# Patient Record
Sex: FEMALE | Race: OTHER | NOT HISPANIC OR LATINO | URBAN - METROPOLITAN AREA
[De-identification: names, ages, dates, MRNs, and addresses within clinical notes are randomized per-mention and may not be internally consistent; named-entity substitution may affect disease eponyms.]

---

## 2020-04-02 ENCOUNTER — OUTPATIENT (OUTPATIENT)
Dept: OUTPATIENT SERVICES | Facility: HOSPITAL | Age: 45
LOS: 1 days | Discharge: HOME | End: 2020-04-02
Payer: COMMERCIAL

## 2020-04-02 DIAGNOSIS — J18.2 HYPOSTATIC PNEUMONIA, UNSPECIFIED ORGANISM: ICD-10-CM

## 2020-04-02 PROCEDURE — 71046 X-RAY EXAM CHEST 2 VIEWS: CPT | Mod: 26

## 2020-05-22 ENCOUNTER — OUTPATIENT (OUTPATIENT)
Dept: OUTPATIENT SERVICES | Facility: HOSPITAL | Age: 45
LOS: 1 days | Discharge: HOME | End: 2020-05-22
Payer: COMMERCIAL

## 2020-05-22 DIAGNOSIS — N63.20 UNSPECIFIED LUMP IN THE LEFT BREAST, UNSPECIFIED QUADRANT: ICD-10-CM

## 2020-05-22 PROCEDURE — 77066 DX MAMMO INCL CAD BI: CPT | Mod: 26

## 2020-05-22 PROCEDURE — G0279: CPT | Mod: 26

## 2020-05-22 PROCEDURE — 76642 ULTRASOUND BREAST LIMITED: CPT | Mod: 26,50

## 2020-06-03 ENCOUNTER — RESULT REVIEW (OUTPATIENT)
Age: 45
End: 2020-06-03

## 2020-06-03 ENCOUNTER — OUTPATIENT (OUTPATIENT)
Dept: OUTPATIENT SERVICES | Facility: HOSPITAL | Age: 45
LOS: 1 days | Discharge: HOME | End: 2020-06-03
Payer: COMMERCIAL

## 2020-06-03 PROCEDURE — 19083 BX BREAST 1ST LESION US IMAG: CPT | Mod: 50

## 2020-06-03 PROCEDURE — 88342 IMHCHEM/IMCYTCHM 1ST ANTB: CPT | Mod: 26

## 2020-06-03 PROCEDURE — 77066 DX MAMMO INCL CAD BI: CPT | Mod: 26

## 2020-06-03 PROCEDURE — 88341 IMHCHEM/IMCYTCHM EA ADD ANTB: CPT | Mod: 26

## 2020-06-03 PROCEDURE — 88305 TISSUE EXAM BY PATHOLOGIST: CPT | Mod: 26

## 2020-06-04 ENCOUNTER — TRANSCRIPTION ENCOUNTER (OUTPATIENT)
Age: 45
End: 2020-06-04

## 2020-06-04 PROBLEM — Z00.00 ENCOUNTER FOR PREVENTIVE HEALTH EXAMINATION: Status: ACTIVE | Noted: 2020-06-04

## 2020-06-04 LAB — SURGICAL PATHOLOGY STUDY: SIGNIFICANT CHANGE UP

## 2020-06-05 DIAGNOSIS — D05.12 INTRADUCTAL CARCINOMA IN SITU OF LEFT BREAST: ICD-10-CM

## 2020-06-05 DIAGNOSIS — D24.1 BENIGN NEOPLASM OF RIGHT BREAST: ICD-10-CM

## 2020-06-05 DIAGNOSIS — R92.0 MAMMOGRAPHIC MICROCALCIFICATION FOUND ON DIAGNOSTIC IMAGING OF BREAST: ICD-10-CM

## 2020-06-05 DIAGNOSIS — N63.10 UNSPECIFIED LUMP IN THE RIGHT BREAST, UNSPECIFIED QUADRANT: ICD-10-CM

## 2020-06-05 DIAGNOSIS — C50.912 MALIGNANT NEOPLASM OF UNSPECIFIED SITE OF LEFT FEMALE BREAST: ICD-10-CM

## 2020-06-09 ENCOUNTER — APPOINTMENT (OUTPATIENT)
Dept: BREAST CENTER | Facility: CLINIC | Age: 45
End: 2020-06-09
Payer: COMMERCIAL

## 2020-06-09 VITALS
SYSTOLIC BLOOD PRESSURE: 160 MMHG | HEIGHT: 60 IN | TEMPERATURE: 98.7 F | BODY MASS INDEX: 25.52 KG/M2 | DIASTOLIC BLOOD PRESSURE: 92 MMHG | WEIGHT: 130 LBS

## 2020-06-09 DIAGNOSIS — Z86.2 PERSONAL HISTORY OF DISEASES OF THE BLOOD AND BLOOD-FORMING ORGANS AND CERTAIN DISORDERS INVOLVING THE IMMUNE MECHANISM: ICD-10-CM

## 2020-06-09 DIAGNOSIS — Z80.0 FAMILY HISTORY OF MALIGNANT NEOPLASM OF DIGESTIVE ORGANS: ICD-10-CM

## 2020-06-09 DIAGNOSIS — Z86.79 PERSONAL HISTORY OF OTHER DISEASES OF THE CIRCULATORY SYSTEM: ICD-10-CM

## 2020-06-09 PROCEDURE — 99204 OFFICE O/P NEW MOD 45 MIN: CPT

## 2020-06-11 PROBLEM — Z86.2 HISTORY OF ANEMIA: Status: RESOLVED | Noted: 2020-06-11 | Resolved: 2020-06-11

## 2020-06-11 PROBLEM — Z80.0 FAMILY HISTORY OF COLON CANCER: Status: ACTIVE | Noted: 2020-06-11

## 2020-06-11 PROBLEM — Z86.79 HISTORY OF HYPERTENSION: Status: RESOLVED | Noted: 2020-06-11 | Resolved: 2020-06-11

## 2020-06-11 NOTE — REVIEW OF SYSTEMS
[Abn Vaginal Bleeding] : unexplained vaginal bleeding [Breast Lump] : breast lump [Hot Flashes] : hot flashes [As Noted in HPI] : as noted in HPI [Negative] : Heme/Lymph [Chills] : no chills [Fever] : no fever [Skin Lesions] : no skin lesions [Skin Wound] : no skin wound [Breast Pain] : no breast pain

## 2020-06-11 NOTE — HISTORY OF PRESENT ILLNESS
[FreeTextEntry1] : Shannan is a 45 premenopausal F with left breast cancer, [id number], ER/SC pos Her 2 neg, anatomic stage IIB, AJCC 8th edition. \par \par Starting in 2019, she started to experience spontaneous nonbloody LEFT nipple discharge.  She did not palpate any abnormal masses in either breast, and denies any breast pain at that time.  Then her discharge recurred several weeks prior, but at this time, she reports feeling a left breast mass that was not painful.  She denies any overlying skin changes, denies palpating any abnormal right breast masses and is currently not experiencing any nipple discharge. \par \par Her work up was as follows:\par 2020 -- b/l dx tomosynthesis\par -heterogenously dense breasts\par -hyperdense mass with calcifications measuring 3.5 cm in superior L breast \par -no suspicious microcalcifications/architectural distortion in R \par b/l US \par RIGHT: \par -@6N4, 1.3 x 1.4 x 0.5 cm oval circumscribed mass --> BIOPSY \par -no axillary lymphadenopathy \par LEFT: \par -@12N2, hypoechoic mass, 6.1 x 2.2 x 5.2 cm with associated calcifications --> BIOPSY \par -no axillary lymphadenopathy\par BIRADS 4\par \par 6/3/2020 -- b/l US CNBx \par -RIGHT @6N4 --> hyalinizing fibroadenoma \par -LEFT @12N2 --> IDC, moderately differentiated, with focal micropapillary and lobular features; DCIS, high nuclear grade, 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

## 2020-06-11 NOTE — DATA REVIEWED
[FreeTextEntry1] : EXAM: US BREAST LIMITED BI \par EXAM: MG MAMMO DIAG W ROMEL BI# \par \par \par PROCEDURE DATE: 05/22/2020 \par \par \par \par INTERPRETATION: Clinical History / Reason for exam: Palpable concern \par superior left breast. Screening right breast. \par \par The patient reports her last clinical breast examination was performed one \par month ago. \par \par Family history: Unknown. \par \par Comparisons: No priors. \par \par Views obtained: bilateral full field digital 2D and digital tomosynthesis \par images as well as spot views. \par \par Computer-aided detection was utilized in the interpretation of this \par examination. \par \par Breast composition: The breasts are heterogeneously dense, which may obscure \par small masses. \par \par Findings: \par \par Mammogram: \par At the area of palpable concern in the superior left breast middle depth \par there is a hyperdense mass with calcifications measuring 3.5 cm. \par There is an oval circumscribed mass in the inferior aspect of the right \par breast measuring 1.5 cm. No suspicious microcalcifications or architectural \par distortion in the right breast. \par \par Ultrasound: \par \par Targeted bilateral breast ultrasound was performed. \par \par Left breast: \par At 12:00 N2 area of palpable concern there is a hypoechoic mass measuring \par 6.1 x 2.2 x 5.2 cm with calcifications. Ultrasound-guided biopsy \par recommended. No left axillary lymphadenopathy. \par \par Right breast: \par At 6:00 N4 there is an oval circumscribed mass measuring 1.3 x 1.4 x 0.5 cm. \par Ultrasound-guided biopsy recommended. No right axillary lymphadenopathy. \par \par Impression: \par Left breast hypoechoic mass with calcifications at 12:00 N2. \par Ultrasound-guided biopsy recommended. \par \par Right breast hypoechoic mass 6 o'clock N4. Ultrasound-guided biopsy \par recommended. \par \par Recommendation: Ultrasound guided biopsy. \par \par BI-RADS Category 4: Suspicious \par \par The above findings and recommendations were discussed with the patient at \par the time of the examination. \par \par The above findings and recommendations were discussed with Dr. Campos on \par 5/22/2020 at 1:23 PM by Dr. Byrd via telephone. \par \par \par \par \par \par \par MICK BYRD M.D., ATTENDING RADIOLOGIST \par This document has been electronically signed. May 22 2020 3:07PM \par \par EXAM: US BX BRST 1ST BI SIS \par \par *** ADDENDUM 06/04/2020 *** \par \par Postprocedure patient follow-up and Pathology result: \par \par -Diagnosis: \par 1. BREAST, RIGHT 6 N4 MASS, ULTRASOUND GUIDED NEEDLE CORE \par BIOPSIES: \par - HYALINIZING FIBROADENOMA. \par \par 2. BREAST, LEFT 12 N2 MASS, ULTRASOUND GUIDED NEEDLE CORE \par BIOPSIES: \par - INVASIVE MODERATELY DIFFERENTIATED DUCTAL CARCINOMA WITH BOTH FOCAL \par MICROPAPILLARY AND LOBULAR FEATURES. 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 \par -The pathology results are concordant with the imaging findings. \par \par -Recommendation: Surgical/oncological consultation recommended. \par \par -No significant delayed complications. \par \par -Results were discussed with patient and her  on 6/4/2020 at 11:50 AM \par by Dr. Byrd via telephone. \par \par The results were also discussed with Dr. Oneil Painter on 6/4/2020 at 12:00 \par AM by Dr. Byrd via telephone. \par \par \par \par \par *** END OF ADDENDUM 06/04/2020 *** \par \par \par \par PROCEDURE DATE: 06/03/2020 \par \par \par \par INTERPRETATION: Clinical History / Reason for exam: Bilateral breast masses. \par \par PROCEDURES: both breasts ultrasound guided spring loaded core biopsy and \par post clip placement two view both breasts mammogram. \par \par Previous films and reports were reviewed. The procedure, its risks, \par benefits, and alternatives were explained to the patient, who expressed \par understanding and gave informed written and verbal consent. A time-out, \par which included the patient's full name, date of birth, description of the \par expected procedure and procedure site, was performed immediately before the \par procedure. \par \par Right breast mass: \par Using the usual sterile technique and ultrasound guidance, the previously \par described mass in the right breast 6:00 N4 was biopsied utilizing a 14-gauge \par automated Bard core biopsy device with a 13-gauge introducer sheath. Six \par samples were collected. A marking clip (Stylesight stop-light) was deployed \par under ultrasound guidance. Hemostasis was obtained and a sterile dressing \par was applied. \par \par Left breast: \par Using the usual sterile technique and ultrasound guidance, the previously \par described mass in the left breast 12:00 N2 was biopsied utilizing a 14-gauge \par automated Bard core biopsy device with a 13-gauge introducer sheath. 5 \par samples were collected. A marking clip (Stylesight top-hat) was deployed under \par ultrasound guidance. Hemostasis was obtained and a sterile dressing was \par applied. \par \par Bilateral mammogram where performed. This demonstrates the biopsy markers in \par appropriate position. \par \par The patient tolerated the procedure well and left the department in good \par condition with written discharge instructions. \par \par \par IMPRESSION: \par \par Successful ultrasound guided core biopsies of the both breast. \par \par Pathology: Pending, to be reported as an addendum. \par \par ***Please see the addendum at the top of this report. It may contain \par additional important information or changes.**** \par \par \par \par \par MICK BYRD M.D., ATTENDING RADIOLOGIST \par This document has been electronically signed. Mike 3 2020 1:51PM \par Addend: MICK BYRD M.D., ATTENDING RADIOLOGIST \par This addendum was electronically signed on: Jun 4 2020 12:08PM. \par

## 2020-06-11 NOTE — ASSESSMENT
[FreeTextEntry1] : Shannan is a 45 premenopausal F with a LEFT breast cancer, IDC, [id number], ER/MN pos, Her 2 neg, anatomic stage IIB, AJCC 8th edition. \par \par ON exam, I was able to palpate a 3 x 5 cm LEFT breast mass in the retroareolar area, without any overlying nipple or skin changes, and no lymphadenopathy b/l.  Her most recent imaging was a b/l dx mammogram and b/l US on 5/22/2020 which revealed a right breast mass @6N4, measuring 1.3 x 1.4 x 0.5 cm, which was a biopsy proven fibroadenoma, and a left breast mass @12N2, measuring 6.1 x 2.2 x 5.2 cm with associated calcifications, which was her biopsy proven breast cancer. \par \par We have discussed her new diagnosis of breast cancer.  She is currently a stage IIB breast cancer, based off AJCC 8th edition.  We discussed her surgical and other treatment options at this time. \par \par In regards to her left sided breast cancer, she is not a great candidate for breast conservation surgery as her mass in located centrally, measuring 6 cm.  We did discuss that at this point, she is also not a candidate for a nipple sparing mastectomy given that the mass is centrally located with nipple discharge. However, there is no difference in survival between a lumpectomy + radiation therapy and a mastectomy.  However the rate of local recurrence is slightly higher with the lumpectomy. The rate of recurrence with a mastectomy is not zero, however, and is likely closer to 4-9%.  \par \par She would also require an evaluation of her axillary lymph nodes via a sentinel lymph node biopsy.  This is done by injecting the perioareolar breast tissue with dye prior to the surgery and removing 1-5 lymph nodes that are the first to drain the breast.  If any of her lymph nodes were positive, she may need an axillary lymph node dissection. \par \par To further delineate the extent of her disease, I would like to obtain a bilateral breast MRI.  \par \par Because of the size of her tumor, she will be considered for chemotherapy.  However, her tumor is ER/MN pos, Her 2 neg.  We discussed the option of giving chemotherapy before or after surgery.  If given before surgery, this is considered neoadjuvant chemotherapy.  The benefits of undergoing neoadjuvant chemotherapy is that it will provide us with better local control of her breast cancer especially if she has a good pathologic response.  If she has a complete clinical response in her breast and lymph nodes, we may be able to avoid a mastectomy which would reduce the morbidity of her surgery.  In addition, it gives us prognostic information regarding her tumor response to chemotherapy.  Patients who have a complete pathologic response (pCR) were found to have an improved prognosis compared to women who do not have a pCR.  \par \par In regards to her treatment options: \par -If she were to pursue surgery upfront, she would need a left breast mastectomy and sentinel LN biopsy, possible ALND.  She would have the option of undergoing immediate vs. delayed reconstruction with a mastectomy.  \par \par To assist with decision making regarding neoadjuvant chemotherapy, I will send an oncotype on her biopsy specimen to help determine if her tumor would even respond well to chemotherapy.  I will have her follow up after her breast MRI, genetic testing, and ONcotype result to further discuss her treatment options. \par \par We did briefly discuss the different radiation options.  If she got a mastectomy, she would likely only need radiation therapy if she had a lot of positive margins or if she had positive axillary lymph nodes.  \par \par At the completion of all these treatments, she should get endocrine therapy, at current time she would need tamoxifen for a total of at least 5 years.  \par \par In regards to reconstruction, if she decides to proceed with a mastectomy,  she has the option for undergoing immediate breast reconstruction.  For this reason, I will have her see plastic surgery for further discussion of her reconstruction options.  \par \par In addition, because of her young age at diagnosis, she meets criteria for panel genetic testing.  About 10% of patients who meet the criteria were found to have a genetic mutation.  [facility] Panel genetic testing was performed today.  \par \par All of her questions were answered.  She knows to call with any further questions or concerns. I will see her after she gets her MRI breast. \par \par PLAN\par -breast MRI \par -?plastic surgery if she wants to consider a mastectomy \par -Oncotype/med onc referral if considering neoadjuvant chemotherapy\par -[facility] panel genetic testing -- blood drawn today \par -f/up after

## 2020-06-11 NOTE — PHYSICAL EXAM
[Normocephalic] : normocephalic [Atraumatic] : atraumatic [No dominant masses] : no dominant masses in right breast  [EOMI] : extra ocular movement intact [No Cervical Adenopathy] : no cervical adenopathy [No Supraclavicular Adenopathy] : no supraclavicular adenopathy [No Nipple Retraction] : no left nipple retraction [No Nipple Discharge] : no right nipple discharge [No Axillary Lymphadenopathy] : no left axillary lymphadenopathy [de-identified] : no suspicious mass palpated  [de-identified] : in retroareolar area, she has a 3 x 5 cm mass without any overlying skin changes, no nipple retraction, no lymphadenopathy, no other suspicious masses palpated

## 2020-06-11 NOTE — PAST MEDICAL HISTORY
[Menstruating] : The patient is menstruating [Definite ___ (Date)] : the last menstrual period was [unfilled] [Menarche Age ____] : age at menarche was [unfilled] [Irregular Cycle Intervals] : are  irregular [Total Preg ___] : G[unfilled] [Live Births ___] : P[unfilled]  [History of Hormone Replacement Treatment] : has no history of hormone replacement treatment [FreeTextEntry5] : s/p L salpingectomy in 2015 [FreeTextEntry6] : hx of IVF [FreeTextEntry7] : denies [FreeTextEntry8] : n/a

## 2020-06-12 ENCOUNTER — RESULT REVIEW (OUTPATIENT)
Age: 45
End: 2020-06-12

## 2020-06-12 ENCOUNTER — OUTPATIENT (OUTPATIENT)
Dept: OUTPATIENT SERVICES | Facility: HOSPITAL | Age: 45
LOS: 1 days | Discharge: HOME | End: 2020-06-12
Payer: COMMERCIAL

## 2020-06-12 DIAGNOSIS — C50.412 MALIGNANT NEOPLASM OF UPPER-OUTER QUADRANT OF LEFT FEMALE BREAST: ICD-10-CM

## 2020-06-12 PROCEDURE — 77049 MRI BREAST C-+ W/CAD BI: CPT | Mod: 26

## 2020-06-23 ENCOUNTER — APPOINTMENT (OUTPATIENT)
Dept: BREAST CENTER | Facility: CLINIC | Age: 45
End: 2020-06-23
Payer: COMMERCIAL

## 2020-06-23 PROCEDURE — 99213 OFFICE O/P EST LOW 20 MIN: CPT

## 2020-06-23 NOTE — ASSESSMENT
[FreeTextEntry1] : Shannan is a 45 premenopausal F with a LEFT breast cancer, IDC, [id number], ER/NM pos, Her 2 neg, anatomic stage IIB, AJCC 8th edition. \par \par ON exam, I was able to palpate a 3 x 5 cm LEFT breast mass in the retroareolar area, without any overlying nipple or skin changes, and no lymphadenopathy b/l.  Her most recent imaging was a b/l dx mammogram and b/l US on 5/22/2020 which revealed a right breast mass @6N4, measuring 1.3 x 1.4 x 0.5 cm, which was a biopsy proven fibroadenoma, and a left breast mass @12N2, measuring 6.1 x 2.2 x 5.2 cm with associated calcifications, which was her biopsy proven breast cancer. \par \par She did have a breast MRI which revealed her area of left central breast cancer was 3.7 x 2.5 x 2.8 cm with an associated area of enhancement measuring <4mm, no lymphadenopathy, and no right sided lesions.  \par \par Her Oncotype RS was 24.  She did not have any genetic mutations on [facility] panel genetic testing. \par \par We re-discussed her options.  Given her MRI findings, I have recommended that she proceed with a skin sparing mastectomy, SLN Bx and possible axillary lymph node dissection with immediate reconstruction with plastic surgery.  I will refer her to Dr. Mansfield  from plastic surgery for this reason.  \par \par In regards to neoadjuvant chemotherapy, I have discussed that based off her recurrence score, this was found to be an intermediate score.  Based off the TAILORx study, women under the age of 50 were found to have ~6.5% chemotherapy benefit so she may be considered for chemotherapy, however her final pathology may influence this decision.  In addition, although we may be able to pursue breast conservation surgery with a central lumpectomy, I do not advise sparing her nipple areolar complex even if she has a good response to the neoadjuvant chemotherapy given the involvement of her nipple on her breast MRI.  FOr this reason, I have recommended that we proceed with surgery first. \par \par AS REVIEW: \par We have discussed her new diagnosis of breast cancer.  She is currently a stage IIB breast cancer, based off AJCC 8th edition.  We discussed her surgical and other treatment options at this time. \par \par In regards to her left sided breast cancer, she is not a great candidate for breast conservation surgery as her mass in located centrally, measuring 6 cm.  We did discuss that at this point, she is also not a candidate for a nipple sparing mastectomy given that the mass is centrally located with nipple discharge. However, there is no difference in survival between a lumpectomy + radiation therapy and a mastectomy.  However the rate of local recurrence is slightly higher with the lumpectomy. The rate of recurrence with a mastectomy is not zero, however, and is likely closer to 4-9%.  \par \par She would also require an evaluation of her axillary lymph nodes via a sentinel lymph node biopsy.  This is done by injecting the perioareolar breast tissue with dye prior to the surgery and removing 1-5 lymph nodes that are the first to drain the breast.  If any of her lymph nodes were positive, she may need an axillary lymph node dissection. \par \par Because of the size of her tumor, she will be considered for chemotherapy.  However, her tumor is ER/NM pos, Her 2 neg.  We discussed the option of giving chemotherapy before or after surgery.  If given before surgery, this is considered neoadjuvant chemotherapy.  The benefits of undergoing neoadjuvant chemotherapy is that it will provide us with better local control of her breast cancer especially if she has a good pathologic response.  If she has a complete clinical response in her breast and lymph nodes, we may be able to avoid a mastectomy which would reduce the morbidity of her surgery.  In addition, it gives us prognostic information regarding her tumor response to chemotherapy.  Patients who have a complete pathologic response (pCR) were found to have an improved prognosis compared to women who do not have a pCR.  \par \par In regards to her treatment options: \par -If she were to pursue surgery upfront, she would need a left breastn skin sparing mastectomy and sentinel LN biopsy, possible ALND.  She would have the option of undergoing immediate vs. delayed reconstruction with a mastectomy.  \par \par We did briefly discuss the different radiation options.  If she got a mastectomy, she would likely only need radiation therapy if she had a lot of positive margins or if she had positive axillary lymph nodes.  \par \par At the completion of all these treatments, she should get endocrine therapy, at current time she would need tamoxifen for a total of at least 5 years.  \par \par In regards to reconstruction, if she decides to proceed with a mastectomy,  she has the option for undergoing immediate breast reconstruction.  For this reason, I will have her see plastic surgery for further discussion of her reconstruction options.  \par \par In addition, because of her young age at diagnosis, she meets criteria for panel genetic testing.  About 10% of patients who meet the criteria were found to have a genetic mutation.  [facility] Panel genetic testing was negative for any mutations.\par \par All of her questions were answered.  She knows to call with any further questions or concerns. Of note, she presented with her  and her friend today. \par \par PLAN\par -plastic surgery referral for evaluation of immediate reconstruction \par -OR: LEFT BREAST SKIN SPARING MASTECTOMY, SLN BX, POSSIBLE AXILLARY LYMPH NODE DISSECTION with immediate reconstruction and pec block.\par -DIAGNOSIS: LEFT BREAST CANCER \par -Shannan would like to think about her options at this point but is agreeable with meeting with the plastic surgeon, Dr. Mansfield.  She will call back when she has made a decision.\par

## 2020-06-23 NOTE — PHYSICAL EXAM
[Atraumatic] : atraumatic [Normocephalic] : normocephalic [No Cervical Adenopathy] : no cervical adenopathy [No Supraclavicular Adenopathy] : no supraclavicular adenopathy [EOMI] : extra ocular movement intact [No dominant masses] : no dominant masses in right breast  [No Nipple Retraction] : no right nipple retraction [No Nipple Discharge] : no right nipple discharge [No Axillary Lymphadenopathy] : no right axillary lymphadenopathy [de-identified] : in retroareolar area, she has a 3 x 5 cm mass without any overlying skin changes, no nipple retraction, no lymphadenopathy, no other suspicious masses palpated  [de-identified] : no suspicious mass palpated

## 2020-06-23 NOTE — REVIEW OF SYSTEMS
[Chills] : no chills [Fever] : no fever [Abn Vaginal Bleeding] : unexplained vaginal bleeding [Breast Pain] : no breast pain [Skin Wound] : no skin wound [Skin Lesions] : no skin lesions [Hot Flashes] : hot flashes [Breast Lump] : breast lump [As Noted in HPI] : as noted in HPI [Negative] : Neurological

## 2020-06-23 NOTE — DATA REVIEWED
[FreeTextEntry1] : EXAM: US BREAST LIMITED BI \par EXAM: MG MAMMO DIAG W ROMEL BI# \par \par \par PROCEDURE DATE: 05/22/2020 \par \par \par \par INTERPRETATION: Clinical History / Reason for exam: Palpable concern \par superior left breast. Screening right breast. \par \par The patient reports her last clinical breast examination was performed one \par month ago. \par \par Family history: Unknown. \par \par Comparisons: No priors. \par \par Views obtained: bilateral full field digital 2D and digital tomosynthesis \par images as well as spot views. \par \par Computer-aided detection was utilized in the interpretation of this \par examination. \par \par Breast composition: The breasts are heterogeneously dense, which may obscure \par small masses. \par \par Findings: \par \par Mammogram: \par At the area of palpable concern in the superior left breast middle depth \par there is a hyperdense mass with calcifications measuring 3.5 cm. \par There is an oval circumscribed mass in the inferior aspect of the right \par breast measuring 1.5 cm. No suspicious microcalcifications or architectural \par distortion in the right breast. \par \par Ultrasound: \par \par Targeted bilateral breast ultrasound was performed. \par \par Left breast: \par At 12:00 N2 area of palpable concern there is a hypoechoic mass measuring \par 6.1 x 2.2 x 5.2 cm with calcifications. Ultrasound-guided biopsy \par recommended. No left axillary lymphadenopathy. \par \par Right breast: \par At 6:00 N4 there is an oval circumscribed mass measuring 1.3 x 1.4 x 0.5 cm. \par Ultrasound-guided biopsy recommended. No right axillary lymphadenopathy. \par \par Impression: \par Left breast hypoechoic mass with calcifications at 12:00 N2. \par Ultrasound-guided biopsy recommended. \par \par Right breast hypoechoic mass 6 o'clock N4. Ultrasound-guided biopsy \par recommended. \par \par Recommendation: Ultrasound guided biopsy. \par \par BI-RADS Category 4: Suspicious \par \par The above findings and recommendations were discussed with the patient at \par the time of the examination. \par \par The above findings and recommendations were discussed with Dr. Campos on \par 5/22/2020 at 1:23 PM by Dr. Byrd via telephone. \par \par \par \par \par \par \par MICK BYRD M.D., ATTENDING RADIOLOGIST \par This document has been electronically signed. May 22 2020 3:07PM \par \par EXAM: US BX BRST 1ST BI SIS \par \par *** ADDENDUM 06/04/2020 *** \par \par Postprocedure patient follow-up and Pathology result: \par \par -Diagnosis: \par 1. BREAST, RIGHT 6 N4 MASS, ULTRASOUND GUIDED NEEDLE CORE \par BIOPSIES: \par - HYALINIZING FIBROADENOMA. \par \par 2. BREAST, LEFT 12 N2 MASS, ULTRASOUND GUIDED NEEDLE CORE \par BIOPSIES: \par - INVASIVE MODERATELY DIFFERENTIATED DUCTAL CARCINOMA WITH BOTH FOCAL \par MICROPAPILLARY AND LOBULAR FEATURES. 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 \par -The pathology results are concordant with the imaging findings. \par \par -Recommendation: Surgical/oncological consultation recommended. \par \par -No significant delayed complications. \par \par -Results were discussed with patient and her  on 6/4/2020 at 11:50 AM \par by Dr. Byrd via telephone. \par \par The results were also discussed with Dr. Oneil Painter on 6/4/2020 at 12:00 \par AM by Dr. Byrd via telephone. \par \par \par \par \par *** END OF ADDENDUM 06/04/2020 *** \par \par \par \par PROCEDURE DATE: 06/03/2020 \par \par \par \par INTERPRETATION: Clinical History / Reason for exam: Bilateral breast masses. \par \par PROCEDURES: both breasts ultrasound guided spring loaded core biopsy and \par post clip placement two view both breasts mammogram. \par \par Previous films and reports were reviewed. The procedure, its risks, \par benefits, and alternatives were explained to the patient, who expressed \par understanding and gave informed written and verbal consent. A time-out, \par which included the patient's full name, date of birth, description of the \par expected procedure and procedure site, was performed immediately before the \par procedure. \par \par Right breast mass: \par Using the usual sterile technique and ultrasound guidance, the previously \par described mass in the right breast 6:00 N4 was biopsied utilizing a 14-gauge \par automated Bard core biopsy device with a 13-gauge introducer sheath. Six \par samples were collected. A marking clip ([employer] stop-light) was deployed \par under ultrasound guidance. Hemostasis was obtained and a sterile dressing \par was applied. \par \par Left breast: \par Using the usual sterile technique and ultrasound guidance, the previously \par described mass in the left breast 12:00 N2 was biopsied utilizing a 14-gauge \par automated Bard core biopsy device with a 13-gauge introducer sheath. 5 \par samples were collected. A marking clip ([employer] top-hat) was deployed under \par ultrasound guidance. Hemostasis was obtained and a sterile dressing was \par applied. \par \par Bilateral mammogram where performed. This demonstrates the biopsy markers in \par appropriate position. \par \par The patient tolerated the procedure well and left the department in good \par condition with written discharge instructions. \par \par \par IMPRESSION: \par \par Successful ultrasound guided core biopsies of the both breast. \par \par Pathology: Pending, to be reported as an addendum. \par \par ***Please see the addendum at the top of this report. It may contain \par additional important information or changes.**** \par \par \par \par \par MICK BYRD M.D., ATTENDING RADIOLOGIST \par This document has been electronically signed. Mike 3 2020 1:51PM \par Addend: MICK BYRD M.D., ATTENDING RADIOLOGIST \par This addendum was electronically signed on: Jun 4 2020 12:08PM. \par \par EXAM:  MR BREAST WAW IC BI\par \par \par PROCEDURE DATE:  06/12/2020\par \par \par \par \par INTERPRETATION:  Clinical History / Reason for exam: Left breast malignancy for extent of disease evaluation.\par \par Technique: Breast MRI is performed at 1.5 T with the patient prone and the breasts in a dedicated breast coil. Following a 3 plane localizer, sagittal T1 weighted, fat-saturated T1 weighted and fat saturated T2-weighted sequence; dynamic contrast enhanced sagittal images; and delayed post-contrast axial fat-saturated T1 weighted images were obtained. 6 mL gadolinium contrast was injected and 1.5 mL was discarded. Subtraction and MIP images were reviewed.  [employer] software was used in interpretation.\par \par Comparison: Mammograms dating back to 5/22/2020\par \par Findings:\par \par Amount of fibroglandular tissue: Heterogeneous fibroglandular tissue\par \par Background parenchymal enhancement: Mild, Symmetric\par \par RIGHT BREAST:\par No abnormal enhancing mass or suspicious area of enhancement. Noted is an area of benign biopsy in the inferior right breast.\par \par The nipple and skin appear normal. There is no axillary adenopathy.\par \par LEFT BREAST:\par There is a biopsy proven malignant mass measuring 3.7 x 2.5 x 2.8 cm in the central aspect of the left breast with extension to the nipple and associated mild nipple retraction. Also noted is an adjacent focus of enhancement which is less than 4 mm from this mass and favored to be part of same process.\par \par The skin appear normal. There is no axillary adenopathy.\par \par The imaged portions of the chest and abdomen are unremarkable.\par \par Impression:\par \par 1. A biopsy proven malignant mass in the central aspect of the left breast with extension to the nipple and associated mild nipple retraction. Also noted is an adjacent focus of enhancement which is less than 4 mm from this mass and favored to be part of same process.\par \par 2. No abnormal enhancing mass in the right breast.\par \par Recommendation: Management as clinically appropriate.\par \par BI-RADS Category 6: Known Biopsy-Proven Malignancy\par \par \par \par \par \par \par MICK BYRD M.D., ATTENDING RADIOLOGIST\par This document has been electronically signed. Mike 15 2020  4:20PM\par \par \par 6/3/2020 -- ONCOTYPE DX \par -RS: 24\par \par 6/9/2020 -- INVITAE Panel genetic testing \par -negative for any mutations

## 2020-06-23 NOTE — PAST MEDICAL HISTORY
[Menstruating] : The patient is menstruating [History of Hormone Replacement Treatment] : has no history of hormone replacement treatment [Definite ___ (Date)] : the last menstrual period was [unfilled] [Menarche Age ____] : age at menarche was [unfilled] [Irregular Cycle Intervals] : are  irregular [Total Preg ___] : G[unfilled] [Live Births ___] : P[unfilled]  [FreeTextEntry5] : s/p L salpingectomy in 2015 [FreeTextEntry6] : hx of IVF [FreeTextEntry7] : denies [FreeTextEntry8] : n/a

## 2020-07-06 ENCOUNTER — APPOINTMENT (OUTPATIENT)
Dept: PLASTIC SURGERY | Facility: CLINIC | Age: 45
End: 2020-07-06
Payer: COMMERCIAL

## 2020-07-06 DIAGNOSIS — Z17.0 MALIGNANT NEOPLASM OF UPPER-OUTER QUADRANT OF LEFT FEMALE BREAST: ICD-10-CM

## 2020-07-06 DIAGNOSIS — C50.412 MALIGNANT NEOPLASM OF UPPER-OUTER QUADRANT OF LEFT FEMALE BREAST: ICD-10-CM

## 2020-07-06 PROCEDURE — 99203 OFFICE O/P NEW LOW 30 MIN: CPT

## 2020-07-06 RX ORDER — AMLODIPINE BESYLATE 5 MG/1
5 TABLET ORAL
Refills: 0 | Status: ACTIVE | COMMUNITY

## 2020-07-06 NOTE — PHYSICAL EXAM
[Bra Size: _______] : Bra Size: [unfilled] [de-identified] : well-appearing, NAD [de-identified] : Bilateral breasts symmetrical, left slightly larger than right, and Grade II ptosis, volume *** greater than *** breast\par Left breast: no palpable masses, nipple retraction or discharge.\par Right breast: no palpable masses, nipple retraction or discharge.\par Chest: no trunk deformities\par \par Breast measurements (standing; (cm)):\par R SN-Nipple: 26\par R Nipple-IMF: 9.5\par R Base width: 12\par R Nipple - midsternum: 9\par R NAC diameter: 3.2\par \par IMF position asymmetrical, left 1cm lower than right\par \par L SN-Nipple: 26.5\par L Nipple-IMF: 10\par L Base width: 12.5\par L Nipple - midsternum: 9\par L NAC diameter: 3.2 [de-identified] : Well-healed long horizontal scar to upper abdomen and laparotomy scars, no palpable hernias

## 2020-07-06 NOTE — ASSESSMENT
[FreeTextEntry1] : 45 premenopausal F with a LEFT breast cancer, IDC, [id number], ER/IN pos, Her 2 neg, anatomic stage IIB, AJCC 8th edition to undergo LEFT breast skin-sparing mastectomy and SLNB.\par \par Patient has not yet decided whether she would like to proceed with only left (unilateral) mastectomy or bilateral mastectomy.  She seems to be leaning toward unilateral left breast reconstruction and right breast symmetry procedure.\par \par She is a good candidate for immediate breast reconstruction with direct-to-silicone implant and alloderm, possible tissue expander placement.  Will consider pre-pectoral implant placement at time of surgery, if indicated. \par \par If she would like to proceed with unilateral breast reconstruction, she is also a good candidate for contralateral mastopexy, possible implant augmenation at a later date for symmetry.\par \par The patient was counseled about reconstructive options following mastectomy, including autologous, prosthetic, and the options of foregoing reconstruction. Additionally, she was explained the options regarding the timing of reconstruction, including immediate reconstruction at the time of mastectomy or delayed reconstruction at a later date. \par \par The patient was extensively counseled on the operation and the perioperative recoveries of both autologous and prosthetic reconstructions. The patient understands the likely position of scars and the use of surgical drains. The patient understands the risks with abdominally based microsurgical reconstruction including partial or total flap loss, revisit to the operating room in the maximino-operative period, wound dehiscence, mastectomy skin flap necrosis, fat necrosis, abdominal wall morbidity (laxity, bulge, hernia), asymmetry, paresthesia, seroma, hematoma, and infection. She also understands the risks with implant-based reconstruction including hematoma, seroma, infection, implant malposition, extrusion, deflation, capsular contracture, mastectomy skin flap necrosis, wound dehiscence, asymmetry, paresthesia, small risk of breast-implant associated lymphoma,  likelihood of requiring additional procedures related to the implant over the course of her lifetime, possible need for additional surgery including revision and/or autologous tissue reconstruction (e.g. pedicled latissimus dorsi flap, TDAP flap, etc).  She was also informed on the off-label use of biologic mesh, its benefits, risks and alternatives. She was given the opportunity to ask questions; and all were answered to her satisfaction at this time.\par \par She is not a suitable candidate abdominal-based microvascular free flap secondary to prior abdominal surgery history.\par \par Patient will return in 1 week after considering her options to confirm reconstructive plan.\par \par Photos were taken.

## 2020-07-06 NOTE — HISTORY OF PRESENT ILLNESS
[FreeTextEntry1] : Pt is a 44 y/o F, , premenopausal, with PMH of HTN who was referred by Dr. Carmen for breast reconstruction consultation. Pt reports she developed a clear-yellow left nipple discharge  followed by a palpable mass warranting imaging and biopsy. Last mammogram was several years prior. Diagnosed with left breast cancer, [id number], ER/FL pos Her 2 neg, anatomic stage IIB\par \par Dr. Carmen has recommended skin sparing mastectomy, SLN Bx and possible axillary lymph node dissection with immediate reconstruction with plastic surgery. May require radiation therapy after surgery.\par \par Ht 5'0" Wt 130lb\par Bra size: 36B, happy at current size and would like to consider larger reconstructive outcome\par Denies VTE or MRSA infections\par PSH: benign pancreatic tail tumor , L salpingectomy

## 2020-07-14 ENCOUNTER — APPOINTMENT (OUTPATIENT)
Dept: PLASTIC SURGERY | Facility: CLINIC | Age: 45
End: 2020-07-14

## 2022-06-06 ENCOUNTER — NON-APPOINTMENT (OUTPATIENT)
Age: 47
End: 2022-06-06

## 2022-06-18 ENCOUNTER — NON-APPOINTMENT (OUTPATIENT)
Age: 47
End: 2022-06-18

## 2022-10-14 ENCOUNTER — NON-APPOINTMENT (OUTPATIENT)
Age: 47
End: 2022-10-14

## 2023-10-18 ENCOUNTER — APPOINTMENT (OUTPATIENT)
Dept: CARDIOLOGY | Facility: CLINIC | Age: 48
End: 2023-10-18